# Patient Record
Sex: MALE | Race: WHITE | NOT HISPANIC OR LATINO | URBAN - METROPOLITAN AREA
[De-identification: names, ages, dates, MRNs, and addresses within clinical notes are randomized per-mention and may not be internally consistent; named-entity substitution may affect disease eponyms.]

---

## 2017-09-11 ENCOUNTER — OFFICE VISIT - HEALTHEAST (OUTPATIENT)
Dept: VASCULAR SURGERY | Facility: CLINIC | Age: 77
End: 2017-09-11

## 2017-09-11 DIAGNOSIS — Z96.653 HISTORY OF KNEE REPLACEMENT, TOTAL, BILATERAL: ICD-10-CM

## 2017-09-11 DIAGNOSIS — I87.2 VENOUS INSUFFICIENCY OF BOTH LOWER EXTREMITIES: ICD-10-CM

## 2017-09-11 DIAGNOSIS — E66.09 NON MORBID OBESITY DUE TO EXCESS CALORIES: ICD-10-CM

## 2017-09-11 DIAGNOSIS — R60.9 DEPENDENT EDEMA: ICD-10-CM

## 2017-09-11 DIAGNOSIS — I50.9 CHF (CONGESTIVE HEART FAILURE) (H): ICD-10-CM

## 2017-09-11 DIAGNOSIS — I89.0 SECONDARY LYMPHEDEMA: ICD-10-CM

## 2017-09-11 DIAGNOSIS — I87.303 VENOUS HYPERTENSION OF BOTH LOWER EXTREMITIES: ICD-10-CM

## 2017-09-11 DIAGNOSIS — N18.3 CHRONIC KIDNEY DISEASE (CKD), STAGE 3 (MODERATE): ICD-10-CM

## 2021-06-12 NOTE — PROGRESS NOTES
[facility] Consult Note    Date of Service:  9/11/2017    Requesting Provider: Dr. Sim Harris    Chief Complaint: BLE swelling    History of Present Illness: David Torres is being seen at the Vascular Clinic today regarding BLE swelling. They arrive to the clinic today alone. The patient reports that he first noted swelling in the legs about 12 years ago after having knee replacement. He reports that he had an u/s done on the legs 3-4 years ago but this was never addressed. He can walk about 2 blocks and then would have to stop due to shortness of breath. Has been diagnosed with CHF; on x2 different diuretics; furosemide and spironolactone; takes these as prescribed. He had a recurring ulcer on the right calf; where he bumps/rubs it on his truck; when it does open it takes a long time to heal. Reports pain of 4/10; burning; worse at the end of the day when the swelling is worse; currently using nothing for pain. Has used tubigrips; compression stockings as compression in the past; had to stop the compression stockings as he could no longer don/doff these due to his knee issues. Denies any fevers, chills, or generalized ill feeling. Denies history of cancer; with the exception of skin cancer; had these excised. Sleeps in a bed with legs elevated. Most hours of the day he has his legs dependent.  Lives with his wife; they have been  55 years; never smoker. Denies history of DVT,  cellulitis; vein procedures. Retired; previously worked in [employer].      Review of Systems:   Constitutional:  negative  for fever, chills or night sweats  EENTM: negative for glasses;  negative [place]  GI:  negative for nausea/vomiting;  negative for constipation  negative diarrhea;  negative for fecal incontinence positive weight loss; he believes this is 15 pounds over 1 year; attributes this to water weight; has not had colonoscopy for 8 years; has had polyps removed in the past  :  negative dysuria,  incontinence; no longer getting PSA; still getting digital exams; no issues; +CKD stage 3  MS: patient minimally ambulatory;  does not use assistive devices  Cardiac:  positive chf  Respiratory:  positive SOB, JIAN; wears cpap at night and with naps  Endocrine:  negative diabetes  Psych:  negative depression/anxiety    Past Medical History:    Past Medical History:   Diagnosis Date     CKD (chronic kidney disease) stage 3, GFR 30-59 ml/min      Diverticulosis      DJD (degenerative joint disease)      Essential hypertension      Gout      Hyperlipidemia      Hypothyroid      Lumbago      Obesity      JIAN (obstructive sleep apnea)      Osteoarthritis      RBBB (right bundle branch block with left anterior fascicular block)      Venous insufficiency        Surgical History:   Past Surgical History:   Procedure Laterality Date     ASD REPAIR  1960     COLONOSCOPY  2004     hyalgan or supartz injection Bilateral 2004    multiple injections     KNEE ARTHROSCOPY Bilateral      TONSILLECTOMY AND ADENOIDECTOMY  1945       Medications:    Current Outpatient Prescriptions:      allopurinol (ZYLOPRIM) 300 MG tablet, Take 300 mg by mouth., Disp: , Rfl:      amLODIPine (NORVASC) 10 MG tablet, Take 10 mg by mouth., Disp: , Rfl:      aspirin 81 mg chewable tablet, Chew 81 mg., Disp: , Rfl:      atorvastatin (LIPITOR) 20 MG tablet, Take 20 mg by mouth., Disp: , Rfl:      furosemide (LASIX) 40 MG tablet, Take 40 mg by mouth., Disp: , Rfl:      levothyroxine (SYNTHROID, LEVOTHROID) 100 MCG tablet, Take 100 mcg by mouth., Disp: , Rfl:      losartan (COZAAR) 50 MG tablet, Take 50 mg by mouth., Disp: , Rfl:      metoprolol succinate (TOPROL-XL) 50 MG 24 hr tablet, Take 50 mg by mouth., Disp: , Rfl:      spironolactone (ALDACTONE) 25 MG tablet, Take 25 mg by mouth., Disp: , Rfl:     Allergies:   Allergies   Allergen Reactions     Naproxen Other (See Comments)     GI upset       Family history:   Family History   Problem Relation Age of  Onset     COPD Mother      Emphysema Mother      Coronary artery disease Father      Heart disease Father        Social History:   Social History     Social History     Marital status:      Spouse name: N/A     Number of children: N/A     Years of education: N/A     Occupational History     Not on file.     Social History Main Topics     Smoking status: Not on file     Smokeless tobacco: Not on file     Alcohol use Not on file     Drug use: Not on file     Sexual activity: Not on file     Other Topics Concern     Not on file     Social History Narrative     No narrative on file        Physical Exam  Vitals: Blood pressure 140/74, pulse 80, temperature 99.3  F (37.4  C), temperature source Temporal, resp. rate 16.  General: This is a 77 y.o. male who appears their reported age, not in acute distress  Head: normocephalic, Atraumatic; not wearing glasses; non-icteric sclera; no exudate; mild hearing loss   Respiratory: fine crackles in b/l bases otherwise clear throughout the rest of the lung fields; slightly labored breathing; no cough   Cardiac: Regular, Rate and Rhythm, no murmurs appreciated   Skin: Uniformly warm and dry  Psych: Alert and oriented x4; calm and cooperative throughout exam  Abdomen: Normal bowel sounds. Hard, rounded, symmetric, no guarding or rigidity, nontender with palpation.    Extremities: BLE non-pitting edema, tissues are firm, fibrotic, scarred down; chronic unmanaged skin changes noted; skin essentially intact there are some thin scattered stable eschar on the right calf strength testing revealed 4/4 to BLEs. Nails are well trimmed; discolored; webbing clear    Peripheral Vascular: normal posterior tibial pulses to bilateral feet , using a handheld doppler these were muted; slightly difficult to find but biphasic in nature; there was significant swelling in the ankles thus contributing to the difficult exam.  Good capillary refill. No unusual venous distention. Positive for spider  veins, telangiectasias, hemosiderin deposition or hyperpigmentation and fibrosis or scarring      Circumferential volume measures:    Vasc Edema 9/11/2017   Right just above MTP 28   Right Ankle 29   Right Widest Calf 49   Right Thigh Up 10cm 56   Left - just above MTP 28   Left Ankle 30   Left Widest Calf 45   Left Thigh Up 10cm 55         Ulceration(s)/Wound(s):          Laboratory studies: No results found for: SEDRATE  No results found for: CREATININE  No results found for: HGBA1C  No results found for: BUN  No results found for: LABPROT, LABALUM, ALBUMIN  No results found for: [id number]          Impression:   Dependent edema  Secondary lymphedema  Scar condition  Obesity   History of b/l knee replacements  CHF  CKD stage 3      Assessment/Plan:  1. Excisional debridement of all the ulcer(s) was not recommended today as the skin was essentially intact; left the stable eschar    2. Edema. We spoke at length regarding their swelling, potential causes, and treatment options. Answered all questions. Their swelling is likely multifactorial including but not limited to the following: their weight, dependency of the limbs for most hours of the day, reduced activity with reduced calf pump mechanism, congestive heart failure, kidney disease, venous hypertension, valvular incompetence, side effect of certain medications and history of joint replacements. We are limited in our options to reduce the patients swelling down; he lives 70 miles away so weekly or biweekly clinic visits are not possible; he declined home care; we opted to teach him how to wrap with short stretch comprilan bandaging; and he will then teach his wife how to do this; he did not know if she would be willing to help out as she does not like feet, we then discussed a long term plan with velcro wraps since his is not able to don compression stockings, these most likely would not be covered by insurance and he does not really want to pay out of pocket;  will discuss this further at our next meeting. The patient should continue to elevate their legs periodically throughout the day. I taught him swelling exercises; provided handout. Continue to take their diuretics per their PMD recommendations; with the crackles I appreciated today he may need to have adjustments made to his dosing; will defer this to his PMD and/or pulmonologist.     3. Treatment recommend Aquaphor daily to the legs    4. Offloading: na    5. Nutrition: no recent nutritional labs; good appetite    Patient to return to clinic in 4 week(s) for re-evaluation. They were instructed to call the clinic sooner with any further questions or concerns. Answered all questions.    Sanjuana Gamboa DNP, RN, CNP, [facility]  [phone]